# Patient Record
Sex: FEMALE | Race: WHITE | NOT HISPANIC OR LATINO | Employment: FULL TIME | URBAN - METROPOLITAN AREA
[De-identification: names, ages, dates, MRNs, and addresses within clinical notes are randomized per-mention and may not be internally consistent; named-entity substitution may affect disease eponyms.]

---

## 2018-02-06 ENCOUNTER — OFFICE VISIT (OUTPATIENT)
Dept: MATERNAL FETAL MEDICINE | Facility: CLINIC | Age: 33
End: 2018-02-06
Payer: COMMERCIAL

## 2018-02-06 VITALS
DIASTOLIC BLOOD PRESSURE: 65 MMHG | WEIGHT: 215 LBS | SYSTOLIC BLOOD PRESSURE: 110 MMHG | BODY MASS INDEX: 32.58 KG/M2 | HEIGHT: 68 IN

## 2018-02-06 DIAGNOSIS — N96 RECURRENT PREGNANCY LOSS WITHOUT CURRENT PREGNANCY: ICD-10-CM

## 2018-02-06 PROCEDURE — 3008F BODY MASS INDEX DOCD: CPT | Mod: S$GLB,,, | Performed by: OBSTETRICS & GYNECOLOGY

## 2018-02-06 PROCEDURE — 99203 OFFICE O/P NEW LOW 30 MIN: CPT | Mod: S$GLB,,, | Performed by: OBSTETRICS & GYNECOLOGY

## 2018-02-06 RX ORDER — PROGESTERONE 200 MG/1
200 CAPSULE ORAL DAILY
COMMUNITY
End: 2022-06-23

## 2018-02-06 NOTE — PROGRESS NOTES
Chief complaint: Recurrent pregnancy loss    Provider requesting consultation: Dr. Alcocer    32 y.o. [id number] Unknown EGA.    PMH:  Past Medical History:   Diagnosis Date    PCOS (polycystic ovarian syndrome)     Tibia fracture        PObHx:  OB History    Para Term  AB Living   4 1 1 0 3 1   SAB TAB Ectopic Multiple Live Births   3 0 0 0 1      # Outcome Date GA Lbr Manoj/2nd Weight Sex Delivery Anes PTL Lv   4 2016 6w0d          3 2015 6w0d          2 2014 13w0d          1 Term 10/28/07 40w0d  3.204 kg (7 lb 1 oz) M Vag-Spont   ADELINE      Complications: Fetal hydronephrosis during pregnancy, antepartum,Immune deficiency disorder          PSH:  Past Surgical History:   Procedure Laterality Date    CHOLECYSTECTOMY      DILATION AND CURETTAGE OF UTERUS         Family history:family history is not on file.    Social history: reports that she has never smoked. She has never used smokeless tobacco. She reports that she does not drink alcohol or use drugs.    A detailed fetal anatomical ultrasound was completed today.  See details in imaging section of EPIC.          A complete family history was obtained during her visit. There is no further family history of recurrent miscarriages.    There is no history of children born with multiple anomalies, mental retardation or chromosome abnormalities. There is no other reported history of birth defects, mental retardation, or other inherited conditions. Consanguinity was denied. She does have a strong family history of MI and stroke on her fathers side with all males before age 50.    Due to Ms.Ruby Jasmine's history of miscarriages, the genetic causes for failing to maintain a pregnancy and infertility were discussed. We discussed that there are several reasons for recurrent first trimester miscarriages. The first of these is a parental chromosome rearrangement. Chromosomes, translocations, and aneuploidies were discussed in detail. She expressed  understanding that parental chromosome rearrangement is the cause for only 2-5% of recurrent pregnancy loss. Chromosome abnormalities not related to parental chromosome rearrangements account for up to 50% of first trimester miscarriages. If she should have future miscarriages, testing on the products of conception could aid in the determination of the cause for the miscarriage.    We also discussed inherited thrombophilias as a potential cause for recurrent pregnancy loss. Mutations in Factor V Leiden, prothrombin, defects in protein C and protein S regulation can cause an increased risk for miscarriages. Though once thought to play a role in miscarriages, mutations in methylenetetrahydrofolate reductase (MTHFR) are no longer though to play a significant role. Many of these disorders can lead to abnormal clotting that impairs the implantation or development of the fetus early in gestation. Factor V Leiden is typically associated with late term pregnancy losses (i.e. 2nd and 3rd trimesters). Other, acquired thrombophilias and lupus anticoagulant studies were discussed in detail with the patient.      During today's visit, laboratory studies were ordered for *Factor V Leiden, Prothrombin, Protein C and Protein S, lupus anticoagulant, and antiphospholipid antibodies.  Reportedly she has already had genetic studies on 2 of her miscarriages and on her  and herself.  Unfortunately, I am not in possession of any records form her [facility] physician or her prior miscarriages.   These have been requested.        She has been scheduled for a follow-up appointment to review labs in approximately 3 weeks. 2 weeks If she should become pregnant in the next few weeks, she will notify our office as well as yours, which will allow for further management discussion.    The approximate physician face-to-face time was 30 minutes. The majority of the time (>50%) was spent on counseling of the patient or coordination of care.

## 2018-02-06 NOTE — LETTER
February 7, 2018      Maria Isabel Alcocer MD  [address] [place] [address]           [place] [facility]  [address] , [address]  [place] [address]  Phone: [phone]          Patient: Karoline Jasmine   MR Number: [id number]   YOB: 1985   Date of Visit: 2/6/2018       Dear Dr. Maria Isabel Alcocer:    Thank you for referring Karoline Jasmine to me for evaluation. Attached you will find relevant portions of my assessment and plan of care.    If you have questions, please do not hesitate to call me. I look forward to following Karoline Jasmine along with you.    Sincerely,    Jacob Oneill MD    Enclosure  CC:  No Recipients    If you would like to receive this communication electronically, please contact [email or web address] or [phone] to request more information on [employer] Link access.    For providers and/or their staff who would like to refer a patient to [facility], please contact us through our one-stop-shop provider referral line, [facility], at [phone].    If you feel you have received this communication in error or would no longer like to receive these types of communications, please e-mail [email or web address]

## 2018-02-27 ENCOUNTER — OFFICE VISIT (OUTPATIENT)
Dept: MATERNAL FETAL MEDICINE | Facility: CLINIC | Age: 33
End: 2018-02-27
Payer: COMMERCIAL

## 2018-02-27 VITALS — WEIGHT: 214 LBS | BODY MASS INDEX: 32.54 KG/M2 | SYSTOLIC BLOOD PRESSURE: 110 MMHG | DIASTOLIC BLOOD PRESSURE: 65 MMHG

## 2018-02-27 DIAGNOSIS — N96 RECURRENT PREGNANCY LOSS WITHOUT CURRENT PREGNANCY: Primary | ICD-10-CM

## 2018-02-27 PROCEDURE — 99213 OFFICE O/P EST LOW 20 MIN: CPT | Mod: S$GLB,,, | Performed by: OBSTETRICS & GYNECOLOGY

## 2018-02-28 NOTE — PROGRESS NOTES
The patient returns to review labs obtained on last visit.  Her genetic thrombophilia w/u, antiphospholipid and LAC all negative.   We have not yet received any meaningful records from [facility].    The patient will go to the office and hand deliver them to us.    So far only abnormality noted is low progesterone.   Will reevaluate when I have genetic studies on POC's and mother/father.

## 2019-03-25 ENCOUNTER — TELEPHONE (OUTPATIENT)
Dept: MATERNAL FETAL MEDICINE | Facility: CLINIC | Age: 34
End: 2019-03-25

## 2019-03-25 DIAGNOSIS — Z36.82 ENCOUNTER FOR NUCHAL TRANSLUCENCY TESTING: Primary | ICD-10-CM

## 2019-03-25 NOTE — TELEPHONE ENCOUNTER
"RN spoke with Debbie at Dr. Alcocer's office to inquire about the patient's referral to be asap. The patient was seen by [facility] in 2018 for a pre-pregnancy consult, with a negative thrombophilia workup. Dr. Oneill recommends that the patient be seen within the range for nuchal translucency and to have Dr. Alcocer draw the patient and her 's karyotype. He also stated that she does not have an indication to be on lovenox injections, but could start a baby ASA, however, it is only a "soft" indication. All information was relayed to Dr. Carlyn Lewis's nurse.    RN also spoke with the patient in regards to these recommendations, per Dr. Oneill, and she is comfortable with this plan. The patient will be seen in [place] at [facility] on 4/23/19.  "

## 2019-04-23 ENCOUNTER — PROCEDURE VISIT (OUTPATIENT)
Dept: MATERNAL FETAL MEDICINE | Facility: CLINIC | Age: 34
End: 2019-04-23
Payer: COMMERCIAL

## 2019-04-23 ENCOUNTER — INITIAL CONSULT (OUTPATIENT)
Dept: MATERNAL FETAL MEDICINE | Facility: CLINIC | Age: 34
End: 2019-04-23
Payer: COMMERCIAL

## 2019-04-23 VITALS
WEIGHT: 212.75 LBS | DIASTOLIC BLOOD PRESSURE: 78 MMHG | SYSTOLIC BLOOD PRESSURE: 110 MMHG | BODY MASS INDEX: 32.35 KG/M2

## 2019-04-23 DIAGNOSIS — Z36.89 ENCOUNTER FOR FETAL ANATOMIC SURVEY: Primary | ICD-10-CM

## 2019-04-23 DIAGNOSIS — Z36.82 ENCOUNTER FOR (NT) NUCHAL TRANSLUCENCY SCAN: ICD-10-CM

## 2019-04-23 DIAGNOSIS — N96 RECURRENT PREGNANCY LOSS WITHOUT CURRENT PREGNANCY: ICD-10-CM

## 2019-04-23 DIAGNOSIS — Z36.82 ENCOUNTER FOR NUCHAL TRANSLUCENCY TESTING: ICD-10-CM

## 2019-04-23 PROCEDURE — 99999 PR PBB SHADOW E&M-EST. PATIENT-LVL III: CPT | Mod: PBBFAC,,, | Performed by: OBSTETRICS & GYNECOLOGY

## 2019-04-23 PROCEDURE — 99999 PR PBB SHADOW E&M-EST. PATIENT-LVL III: ICD-10-PCS | Mod: PBBFAC,,, | Performed by: OBSTETRICS & GYNECOLOGY

## 2019-04-23 PROCEDURE — 76813 OB US NUCHAL MEAS 1 GEST: CPT | Mod: S$GLB,,, | Performed by: OBSTETRICS & GYNECOLOGY

## 2019-04-23 PROCEDURE — 99215 OFFICE O/P EST HI 40 MIN: CPT | Mod: 25,S$GLB,, | Performed by: OBSTETRICS & GYNECOLOGY

## 2019-04-23 PROCEDURE — 76813 PR US, OB NUCHAL, TRANSABDOM/TRANSVAG, FIRST GESTATION: ICD-10-PCS | Mod: S$GLB,,, | Performed by: OBSTETRICS & GYNECOLOGY

## 2019-04-23 PROCEDURE — 99215 PR OFFICE/OUTPT VISIT, EST, LEVL V, 40-54 MIN: ICD-10-PCS | Mod: 25,S$GLB,, | Performed by: OBSTETRICS & GYNECOLOGY

## 2019-04-23 NOTE — LETTER
April 26, 2019      Maria Isabel Alcocer MD  [address] [place] [address]           [facility]  [address] [address]  Phone: [phone]          Patient: Karoline Jasmine   MR Number: [id number]   YOB: 1985   Date of Visit: 4/23/2019       Dear Dr. Maria Isabel Alcocer:    Thank you for referring Karoline Jasmine to me for evaluation. Attached you will find relevant portions of my assessment and plan of care.    If you have questions, please do not hesitate to call me. I look forward to following Karoline Jasmine along with you.    Sincerely,    Sherley Gray MD    Enclosure  CC:  No Recipients    If you would like to receive this communication electronically, please contact [email or web address] or [phone] to request more information on [employer] Link access.    For providers and/or their staff who would like to refer a patient to [facility], please contact us through our one-stop-shop provider referral line, [facility], at [phone].    If you feel you have received this communication in error or would no longer like to receive these types of communications, please e-mail [email or web address]

## 2019-04-26 NOTE — PROGRESS NOTES
"Indication  ========    Consultation. Confirmation of cardiac activity.    History  ======    Previous Outcomes  Preg. no. 1  Outcome: Live YOB: 2007  Gest. age 40 w + 0 d  Gender: male  Details:  7 lb 1 oz primary combined immune deficiency  Preg. no. 2  Outcome: Spontaneous miscarriage  Gest. age 13 w + 0 d  Details:   Preg. no. 3  Outcome: Spontaneous miscarriage  Gest. age 6 w + 0 d  Details:   Preg. no. 4  Outcome: Spontaneous miscarriage  Gest. age 6 w + 0 d  Details:    5  Para 1  Martines children born living (T) 1  Martines children born (T) 1  Abortions (A) 3  Martines living children (L) 1  Miscarriages 3  Risk Factors  Details: Recurrent pregnancy loss  Details: Hypothyroid    Maternal Assessment  =================    Weight 97 kg  Weight (lb) 214 lb  BP syst 110 mmHg  BP diast 78 mmHg    Method  ======    Transabdominal ultrasound examination, Voluson E10. View: Good view.    Pregnancy  =========    Martines pregnancy. Number of fetuses: 1.    Dating  ======    GA by "stated dating" 11 w + 3 d  ROMEL by "stated dating": 2019  "Stated dating" by: Dr. DAWIT Alcocer  Ultrasound examination on: 2019  GA by U/S based upon: CRL  GA by U/S 12 w + 4 d  ROMEL by U/S: 2019  Assigned: Dating performed on 2019, based on the external assessment (by Dr. DAWIT Alcocer)  Assigned GA 11 w + 3 d  Assigned ROMEL: 2019    General Evaluation  ==============    Cardiac activity: present.  Amniotic fluid: normal amount.    Fetal Biometry  ============    CRL 62.0 mm 12w 4d Hadlock   bpm    Fetal Anatomy  ============    The following structures appear normal:  Cranium. Abdominal wall.    The following structures were visualized:  Arms. Legs.    Maternal Structures  ===============    Uterus / Cervix  Uterus: Normal  Ovaries / Tubes / Adnexa  Rt ovary: Normal  Rt ovary D1 2.1 cm  Rt ovary D2 1.8 cm  Rt ovary D3 1.8 cm  Rt ovary mean 1.9 cm  Rt ovary vol 3.4 cm³  Lt " ovary: Normal  Lt ovarian corpus luteum: visualized  Lt ovary D1 3.2 cm  Lt ovary D2 2.6 cm  Lt ovary D3 2.6 cm  Lt ovary mean 2.8 cm  Lt ovary vol 11.3 cm³  Lt ovarian corpus luteum D1 16.8 mm  Lt ovarian corpus luteum D2 16.6 mm  Lt ovarian corpus luteum D3 13.0 mm  Pouch of Giles / Other Structures  Free fluid: No free fluid visualized    Consultation  ==========    Type: Recurrent loss.  Ms. Jasmine is a 32y/o  at 11 and 3 weeks based on LMP not agreeing with 6 week ultrasound. By our ultrasound today, she is 12  weeks and 4 days.  Patient has seen Dr. Farooq in the past for counseling. He recommend the patient and her  have karyotypes drawn. She believes  she had done but he has not and no records were provided of this. Patient reports that she has had workup for JASON-reviewed limited records  scanned in [facility]. Patient was questioning whether she should be on Lovenox. Has not seen JASON in a few years.    PMHx: Hypothyroid-no meds  Bradycardia-HR in 60s at times-reports goes to as low as the 30s-no symptoms-normal pulse today (low 60s)  Recurrent pregnancy loss  PSHx: Gallbladder  D and Cx3  Family history birth defects: patient's son had hydronephrosis during pregnancy per chart (she did not report) and has primary combined  immunodeficiency syndrome and has weekly IVIG-no genetic mutation has been found-patient herself reports that she never gets sick  NIPT low risk  Meds: PNV, baby ASA,vaginal progesterone  POBHx: -Term  7 pounds 1 ounces  2014-13 week miscarriage- D and C-started with heart tones  2015-6 week miscarriage- D and C-started with heart tones-genetics normal per patient  2016-6 week miscarriage- D and C-started with heart tones-genetics normal per patient  Social: Denies smoking, alcohol, illicit drug use  chart also lists history of tibia fracture. per dr. farooq note there is history of stroke/mi in family    Normal HSG, negative APLS workup (ACLA, LAC and beta 2  glycoproteins), negative thrombophilia workup by Dr. Oneill  TSH 1.320  JASON notes in 2017-note normal male karyotype on one of the products-appears to be last loss and lists negative RPL workup    A/P:  1. IUP at 11 and 3 vs 12 and 4-Typically, the earliest ultrasound is the most accurate. We do not have access to outside images. Recommend  primary ob review images and determine which ROMEL desire to use and notify our office.    2. Recurrent pregnancy loss: The patient's chances of having a normal pregnancy outcome are good. We discussed unfortunately there is not  a safe point to say all is well but findings are very reassuring. Recommend primary ob confirm patient has had an assessment of diabetes  (would recommend primary ob check hgba1c at next appointment and if reassuring do traditional diabetes screening at 24 to 28 weeks) and  that maternal and paternal karyotypes are done. If the patient has another loss, recommend the tissue be sent for analysis for chromosomal  abnormalities. Recommend patient's thyroid be monitored. I discussed with her that she has no indication to be on Lovenox therapy as per Dr. Oneill's consult. Baby aspirin may or may not offer benefit but I believe it is reasonable to continue this.    3. Hypothyroidism: Her first trimester TSH was less than 2.5 which is in the appropriate range. The patient's TSH should be kept within the  range defined by ACOG (2nd trimester 0.2 to 3.0 mIU/L) (3rd trimester 0.3 to 3.0 mIU/L). Adverse outcomes have been associated with  uncontrolled thyroid disease. Patients with symptomatic hypothyroidism have a higher incidence of preeclampsia, placental abruption, IUGR  and fetal demise. Women who become euthyroid have significantly less complications than those who remain hypothyroid. The mainstay of  therapy is levothyroxine. Pregnant women typically require an increase in  their dosage of medication as the pregnancy progresses due to the increase in  plasma volume. TSH levels should be followed every 4 weeks  until euthyroid then every 6- 8 weeks for the remainder of the pregnancy. Immediately after delivery, the dosage of levothyroxine should be  reduced appropriately with another TSH level ordered 4 weeks postpartum.    4. Son with Immunodeficiency syndrome: Discussed with patient that there are different patterns of inheritance and it is difficult to give a  recurrence risk. Encourage patient to ask her son's physician about reanalysis of his genetic testing given advances in genetics over the past  few years. Advised her to remind pediatricians of her son's condition when this baby is born.    5. Per chart notes, her son had hydronephrosis. We will assess for this on anatomic survey. Notify peds of prior child's history.    6. Recommend growth ultrasound at approximately 32 weeks.    7. Primary ob should offer patient MSAFP only in the second trimester.    8.Recommend primary ob obtain EKG and refer to cardiology due to history of bradycardia.    45 minutes was spent in face to face time with greater than half of that time spent in counseling and coordination of care.    Impression  =========    Martines live intrauterine pregnancy.  Fetus measures 8 days ahead but patient was dated by first trimester scan not agreeing with LMP in her OB office.  Limited early anatomy appears normal. Unable to obtain accurate NT.  Left corpus luteum.    Recommendation  ==============    Follow up ultrasound in approximately 8 weeks in [employer] for anatomy scan (targeted).  Patient has no indication for Lovenox therapy at this time.  Recommend primary ob check karyotypes on patient and her  if this has not been done. Please forward to M.  Recommend primary ob confirm what ROMEL is being used for patient and notify our office.  Recommend patient consider having her son's whole genome/exome reanalyzed.  Primary ob managing thyroid disease. (see above)  Recommend primary  ob obtain EKG and refer to cardiology due to history of bradycardia.  Patient should also be screened for diabetes (see above).  Recommend growth ultrasound around 32 weeks of pregnancy.  Primary ob should offer patient MSAFP only in the second trimester.  Recommend primary ob notify peds of prior child's history.  Primary ob should monitor weight gain given BMI.

## 2019-06-26 ENCOUNTER — PROCEDURE VISIT (OUTPATIENT)
Dept: MATERNAL FETAL MEDICINE | Facility: CLINIC | Age: 34
End: 2019-06-26
Payer: COMMERCIAL

## 2019-06-26 VITALS
WEIGHT: 214.31 LBS | BODY MASS INDEX: 32.58 KG/M2 | SYSTOLIC BLOOD PRESSURE: 94 MMHG | DIASTOLIC BLOOD PRESSURE: 68 MMHG | HEART RATE: 95 BPM

## 2019-06-26 DIAGNOSIS — Z36.89 ENCOUNTER FOR ULTRASOUND TO CHECK FETAL GROWTH: Primary | ICD-10-CM

## 2019-06-26 DIAGNOSIS — Z36.89 ENCOUNTER FOR FETAL ANATOMIC SURVEY: ICD-10-CM

## 2019-06-26 DIAGNOSIS — O44.02 PLACENTA PREVIA IN SECOND TRIMESTER: ICD-10-CM

## 2019-06-26 PROCEDURE — 99202 PR OFFICE/OUTPT VISIT, NEW, LEVL II, 15-29 MIN: ICD-10-PCS | Mod: 25,,, | Performed by: OBSTETRICS & GYNECOLOGY

## 2019-06-26 PROCEDURE — 99202 OFFICE O/P NEW SF 15 MIN: CPT | Mod: 25,,, | Performed by: OBSTETRICS & GYNECOLOGY

## 2019-06-26 PROCEDURE — 76805 PR US, OB 14+WKS, TRANSABD, SINGLE GESTATION: ICD-10-PCS | Mod: ,,, | Performed by: OBSTETRICS & GYNECOLOGY

## 2019-06-26 PROCEDURE — 76805 OB US >/= 14 WKS SNGL FETUS: CPT | Mod: ,,, | Performed by: OBSTETRICS & GYNECOLOGY

## 2019-06-26 RX ORDER — NAPROXEN SODIUM 220 MG/1
81 TABLET, FILM COATED ORAL DAILY
COMMUNITY
End: 2022-06-23

## 2019-06-26 NOTE — PROGRESS NOTES
Chief complaint: Recurrent pregnancy loss, Placenta previa    Provider requesting consultation: Dr. Alcocer    33 y.o. [id number] 20w4d EGA    PMH:  Past Medical History:   Diagnosis Date    PCOS (polycystic ovarian syndrome)     Tibia fracture        PObHx:  OB History    Para Term  AB Living   5 1 1 0 3 1   SAB TAB Ectopic Multiple Live Births   3 0 0 0 1      # Outcome Date GA Lbr Manoj/2nd Weight Sex Delivery Anes PTL Lv   5 Current            4 2016 6w0d          3 2015 6w0d          2 2014 13w0d          1 Term 10/28/07 40w0d  3.204 kg (7 lb 1 oz) M Vag-Spont   ADELINE      Complications: Fetal hydronephrosis during pregnancy, antepartum, Immune deficiency disorder       PSH:  Past Surgical History:   Procedure Laterality Date    CHOLECYSTECTOMY      DILATION AND CURETTAGE OF UTERUS         Family history:family history is not on file.    Social history: reports that she has never smoked. She has never used smokeless tobacco. She reports that she does not drink alcohol or use drugs.    A detailed fetal anatomical ultrasound was completed today.  See details in imaging section of EPIC.      On ultrasound today the presence of a complete previa was seen.  I discussed with patient the bleeding risks associated with placenta previa.  I also reviewed the normal growth of the uterus and the high likelihood that the placental edge will migrate away from the cervical os.  After today's visit I would like to reinspect placental location at 32-36 weeks EGA to confirm it has moved from the cervical os.  Bleeding precautions and pelvic rest reviewed with patient today.     Results of today's ultrasound discussed with patient.  I spent 15 minutes with patient today over half of which was in consultation separate of her ultrasound examination.     Referring physician to receive copy of today's consultation via electronic medical record.    The patient was given an opportunity to ask questions about  management and the diease process.  She expressed an understanding of and agreement to the above impression and plan. All questions were answered to her satisfaction.  She was given contact information to the [facility] clinic to address further concerns.

## 2019-07-24 ENCOUNTER — TELEPHONE (OUTPATIENT)
Dept: MATERNAL FETAL MEDICINE | Facility: CLINIC | Age: 34
End: 2019-07-24

## 2019-07-24 NOTE — TELEPHONE ENCOUNTER
"Pt calling to report "constant dull pain, throbbing every now and then". Since yesterday she has been experiencing "some cramping". Pt states she "mentioned it to Dr Alcocer at OB appointment yesterday but she said that's normal". Pt denies any vaginal bleeding or leaking. Pt states she felt the baby a little last night but not much today. Pt keeps saying "I'm just not sure." Instructed pt to go to nearest L&D for full evaluation.    "

## 2019-07-29 ENCOUNTER — TELEPHONE (OUTPATIENT)
Dept: MATERNAL FETAL MEDICINE | Facility: CLINIC | Age: 34
End: 2019-07-29

## 2019-07-29 ENCOUNTER — PROCEDURE VISIT (OUTPATIENT)
Dept: MATERNAL FETAL MEDICINE | Facility: CLINIC | Age: 34
End: 2019-07-29
Payer: COMMERCIAL

## 2019-07-29 VITALS
DIASTOLIC BLOOD PRESSURE: 68 MMHG | WEIGHT: 217.38 LBS | BODY MASS INDEX: 33.06 KG/M2 | SYSTOLIC BLOOD PRESSURE: 118 MMHG

## 2019-07-29 DIAGNOSIS — Z36.89 ENCOUNTER FOR ULTRASOUND TO ASSESS FETAL GROWTH: Primary | ICD-10-CM

## 2019-07-29 DIAGNOSIS — Z36.89 ENCOUNTER FOR ULTRASOUND TO CHECK FETAL GROWTH: ICD-10-CM

## 2019-07-29 PROCEDURE — 99499 UNLISTED E&M SERVICE: CPT | Mod: ,,, | Performed by: OBSTETRICS & GYNECOLOGY

## 2019-07-29 PROCEDURE — 76816 PR  US,PREGNANT UTERUS,F/U,TRANSABD APP: ICD-10-PCS | Mod: ,,, | Performed by: OBSTETRICS & GYNECOLOGY

## 2019-07-29 PROCEDURE — 99499 NO LOS: ICD-10-PCS | Mod: ,,, | Performed by: OBSTETRICS & GYNECOLOGY

## 2019-07-29 PROCEDURE — 76816 OB US FOLLOW-UP PER FETUS: CPT | Mod: ,,, | Performed by: OBSTETRICS & GYNECOLOGY

## 2019-07-29 NOTE — TELEPHONE ENCOUNTER
Called and spoke to Dr. Carlyn Lewis's nurse to confirm which due date is being used for patient.  Debbie stated that they are using 11/9/19.

## 2019-08-26 ENCOUNTER — TELEPHONE (OUTPATIENT)
Dept: MATERNAL FETAL MEDICINE | Facility: CLINIC | Age: 34
End: 2019-08-26

## 2019-08-26 NOTE — TELEPHONE ENCOUNTER
Pt returned call to [facility] clinic and RN discussed physician lecture on 9/20/19. Offered patient to come in earlier in the am or to see another [facility] provider. Patient checked her schedule and is able to come in for 920am appointment.    Pt verbalized understanding of information.

## 2019-08-26 NOTE — TELEPHONE ENCOUNTER
Message left for pt to call [facility] clinic at [phone] to confirm that patient can see Dr. Oneill on 9/20/19 or if she prefer to see Dr. Gray, will need to reschedule to earlier in the am.

## 2019-09-20 ENCOUNTER — INITIAL CONSULT (OUTPATIENT)
Dept: MATERNAL FETAL MEDICINE | Facility: CLINIC | Age: 34
End: 2019-09-20
Attending: OBSTETRICS & GYNECOLOGY
Payer: COMMERCIAL

## 2019-09-20 ENCOUNTER — HOSPITAL ENCOUNTER (EMERGENCY)
Facility: OTHER | Age: 34
Discharge: HOME | End: 2019-09-20
Attending: OBSTETRICS & GYNECOLOGY
Payer: COMMERCIAL

## 2019-09-20 VITALS
SYSTOLIC BLOOD PRESSURE: 113 MMHG | HEART RATE: 67 BPM | DIASTOLIC BLOOD PRESSURE: 81 MMHG | OXYGEN SATURATION: 97 % | TEMPERATURE: 97 F

## 2019-09-20 VITALS — SYSTOLIC BLOOD PRESSURE: 108 MMHG | BODY MASS INDEX: 34.29 KG/M2 | DIASTOLIC BLOOD PRESSURE: 64 MMHG | WEIGHT: 225.5 LBS

## 2019-09-20 DIAGNOSIS — Z36.89 ENCOUNTER FOR ULTRASOUND TO CHECK FETAL GROWTH: Primary | ICD-10-CM

## 2019-09-20 DIAGNOSIS — O44.03 PLACENTA PREVIA IN THIRD TRIMESTER: ICD-10-CM

## 2019-09-20 DIAGNOSIS — O26.893 VAGINAL DISCHARGE DURING PREGNANCY IN THIRD TRIMESTER: Primary | ICD-10-CM

## 2019-09-20 DIAGNOSIS — N89.8 VAGINAL DISCHARGE DURING PREGNANCY IN THIRD TRIMESTER: Primary | ICD-10-CM

## 2019-09-20 DIAGNOSIS — O13.3 GESTATIONAL HYPERTENSION, THIRD TRIMESTER: ICD-10-CM

## 2019-09-20 DIAGNOSIS — Z36.89 ENCOUNTER FOR ULTRASOUND TO ASSESS FETAL GROWTH: ICD-10-CM

## 2019-09-20 DIAGNOSIS — Z36.89 ULTRASOUND SCAN TO EVALUATE PLACENTA LOCATION: ICD-10-CM

## 2019-09-20 DIAGNOSIS — Z3A.32 32 WEEKS GESTATION OF PREGNANCY: ICD-10-CM

## 2019-09-20 DIAGNOSIS — O44.40 LOW-LYING PLACENTA: ICD-10-CM

## 2019-09-20 DIAGNOSIS — O26.843 SIGNIFICANT DISCREPANCY BETWEEN UTERINE SIZE AND CLINICAL DATES, ANTEPARTUM, THIRD TRIMESTER: ICD-10-CM

## 2019-09-20 DIAGNOSIS — Z36.9 ENCOUNTER FOR FETAL ULTRASOUND: ICD-10-CM

## 2019-09-20 LAB
ALBUMIN SERPL BCP-MCNC: 3.1 G/DL (ref 3.5–5.2)
ALP SERPL-CCNC: 140 U/L (ref 55–135)
ALT SERPL W/O P-5'-P-CCNC: 8 U/L (ref 10–44)
ANION GAP SERPL CALC-SCNC: 9 MMOL/L (ref 8–16)
AST SERPL-CCNC: 11 U/L (ref 10–40)
BASOPHILS # BLD AUTO: 0.03 K/UL (ref 0–0.2)
BASOPHILS NFR BLD: 0.4 % (ref 0–1.9)
BILIRUB SERPL-MCNC: 0.2 MG/DL (ref 0.1–1)
BUN SERPL-MCNC: 10 MG/DL (ref 6–20)
CALCIUM SERPL-MCNC: 9.4 MG/DL (ref 8.7–10.5)
CHLORIDE SERPL-SCNC: 106 MMOL/L (ref 95–110)
CO2 SERPL-SCNC: 21 MMOL/L (ref 23–29)
CREAT SERPL-MCNC: 0.7 MG/DL (ref 0.5–1.4)
CREAT UR-MCNC: 59.6 MG/DL (ref 15–325)
DIFFERENTIAL METHOD: ABNORMAL
EOSINOPHIL # BLD AUTO: 0.1 K/UL (ref 0–0.5)
EOSINOPHIL NFR BLD: 0.7 % (ref 0–8)
ERYTHROCYTE [DISTWIDTH] IN BLOOD BY AUTOMATED COUNT: 13.3 % (ref 11.5–14.5)
EST. GFR  (AFRICAN AMERICAN): >60 ML/MIN/1.73 M^2
EST. GFR  (NON AFRICAN AMERICAN): >60 ML/MIN/1.73 M^2
GLUCOSE SERPL-MCNC: 74 MG/DL (ref 70–110)
HCT VFR BLD AUTO: 38.9 % (ref 37–48.5)
HGB BLD-MCNC: 13 G/DL (ref 12–16)
IMM GRANULOCYTES # BLD AUTO: 0.03 K/UL (ref 0–0.04)
IMM GRANULOCYTES NFR BLD AUTO: 0.4 % (ref 0–0.5)
LYMPHOCYTES # BLD AUTO: 1.3 K/UL (ref 1–4.8)
LYMPHOCYTES NFR BLD: 16.8 % (ref 18–48)
MCH RBC QN AUTO: 30.7 PG (ref 27–31)
MCHC RBC AUTO-ENTMCNC: 33.4 G/DL (ref 32–36)
MCV RBC AUTO: 92 FL (ref 82–98)
MONOCYTES # BLD AUTO: 0.4 K/UL (ref 0.3–1)
MONOCYTES NFR BLD: 5.4 % (ref 4–15)
NEUTROPHILS # BLD AUTO: 5.8 K/UL (ref 1.8–7.7)
NEUTROPHILS NFR BLD: 76.3 % (ref 38–73)
NRBC BLD-RTO: 0 /100 WBC
PLATELET # BLD AUTO: 200 K/UL (ref 150–350)
PMV BLD AUTO: 12.8 FL (ref 9.2–12.9)
POTASSIUM SERPL-SCNC: 4 MMOL/L (ref 3.5–5.1)
PROT SERPL-MCNC: 6.8 G/DL (ref 6–8.4)
PROT UR-MCNC: <7 MG/DL (ref 0–15)
PROT/CREAT UR: NORMAL MG/G{CREAT} (ref 0–0.2)
RBC # BLD AUTO: 4.24 M/UL (ref 4–5.4)
SODIUM SERPL-SCNC: 136 MMOL/L (ref 136–145)
WBC # BLD AUTO: 7.62 K/UL (ref 3.9–12.7)

## 2019-09-20 PROCEDURE — 36415 COLL VENOUS BLD VENIPUNCTURE: CPT

## 2019-09-20 PROCEDURE — 99284 EMERGENCY DEPT VISIT MOD MDM: CPT | Mod: 25

## 2019-09-20 PROCEDURE — 99999 PR PBB SHADOW E&M-EST. PATIENT-LVL II: ICD-10-PCS | Mod: PBBFAC,,, | Performed by: OBSTETRICS & GYNECOLOGY

## 2019-09-20 PROCEDURE — 80053 COMPREHEN METABOLIC PANEL: CPT

## 2019-09-20 PROCEDURE — 99999 PR PBB SHADOW E&M-EST. PATIENT-LVL II: CPT | Mod: PBBFAC,,, | Performed by: OBSTETRICS & GYNECOLOGY

## 2019-09-20 PROCEDURE — 76820 PR US, OB DOPPLER, FETAL UMBILICAL ARTERY ECHO: ICD-10-PCS | Mod: S$GLB,,, | Performed by: OBSTETRICS & GYNECOLOGY

## 2019-09-20 PROCEDURE — 76816 PR  US,PREGNANT UTERUS,F/U,TRANSABD APP: ICD-10-PCS | Mod: S$GLB,,, | Performed by: OBSTETRICS & GYNECOLOGY

## 2019-09-20 PROCEDURE — 76820 UMBILICAL ARTERY ECHO: CPT | Mod: S$GLB,,, | Performed by: OBSTETRICS & GYNECOLOGY

## 2019-09-20 PROCEDURE — 85025 COMPLETE CBC W/AUTO DIFF WBC: CPT

## 2019-09-20 PROCEDURE — 99499 NO LOS: ICD-10-PCS | Mod: S$GLB,,, | Performed by: OBSTETRICS & GYNECOLOGY

## 2019-09-20 PROCEDURE — 99215 OFFICE O/P EST HI 40 MIN: CPT | Mod: 25,S$GLB,, | Performed by: OBSTETRICS & GYNECOLOGY

## 2019-09-20 PROCEDURE — 76819 PR US, OB, FETAL BIOPHYSICAL, W/O NST: ICD-10-PCS | Mod: 59,S$GLB,, | Performed by: OBSTETRICS & GYNECOLOGY

## 2019-09-20 PROCEDURE — 82570 ASSAY OF URINE CREATININE: CPT

## 2019-09-20 PROCEDURE — 76819 FETAL BIOPHYS PROFIL W/O NST: CPT | Mod: 59,S$GLB,, | Performed by: OBSTETRICS & GYNECOLOGY

## 2019-09-20 PROCEDURE — 59025 FETAL NON-STRESS TEST: CPT

## 2019-09-20 PROCEDURE — 99215 PR OFFICE/OUTPT VISIT, EST, LEVL V, 40-54 MIN: ICD-10-PCS | Mod: 25,S$GLB,, | Performed by: OBSTETRICS & GYNECOLOGY

## 2019-09-20 PROCEDURE — 99499 UNLISTED E&M SERVICE: CPT | Mod: S$GLB,,, | Performed by: OBSTETRICS & GYNECOLOGY

## 2019-09-20 PROCEDURE — 76816 OB US FOLLOW-UP PER FETUS: CPT | Mod: S$GLB,,, | Performed by: OBSTETRICS & GYNECOLOGY

## 2019-09-20 NOTE — PROGRESS NOTES
Radha with OB ED notified that pt is coming for evaluation of BP and ROM per Dr Gray. She verbalized understanding and reports the OB ED provider is already aware the pt is coming.

## 2019-09-20 NOTE — PROGRESS NOTES
Indication  ========    F/U Consultation. Follow-up evaluation for fetal growth and placental location    History  ======    General History  Height 173 cm  Height (ft) 5 ft  Height (in) 8 in  Medical History  Surgery: D&C x4  Previous Outcomes   5  Para 1  [facility] children born living (T) 1  [facility] children born (T) 1  Abortions (A) 3  [facility] living children (L) 1  Miscarriages 3  Preg. no. 1  Outcome: live birth  Date: Outcome date: 2007-10-28  Gest. age 40 w + 0 d  Gender: male  Details:  7 lb 1 oz primary combined immune deficiency  Preg. no. 2  Outcome: miscarriage  Gest. age 13 w + 0 d  Details: , D&C x2  Preg. no. 3  Outcome: miscarriage  Gest. age 6 w + 0 d  Details: , D&C  Preg. no. 4  Outcome: miscarriage  Gest. age 6 w + 0 d  Details: 2016, D&C  Risk Factors  Details: Recurrent pregnancy loss  Details: Hypothyroid    Pregnancy History  ==============    Maternal Lab Tests  Result:    AFP tetra negative DSR 1:2994 (age DSR 1:374), T18 risk not increased    Maternal Assessment  =================    Height 173 cm  Height (ft) 5 ft  Height (in) 8 in  Weight 102 kg  Weight (lb) 225 lb  BMI 34.19 kg/m²  BP syst 108 mmHg  BP diast 64 mmHg    Fetal Growth Overview  =================    Exam date        GA              BPD (mm)         HC (mm)        AC (mm)        FL (mm)         HL (mm)        EFW (g)  2019        20w 4d        48.6                  185.3             160.7             35.1              33.4               398    40%  2019        25w 2d        61.9                  229.0             215.4             47.3              45.4               852    55%  2019        32w 6d        77.8                  303.5             268.3             62.7                                   1,821    15%      Method  ======    2D Color Doppler, Voluson E10, Transabdominal ultrasound examination. View: Suboptimal view: limited by fetal position. Suboptimal view:  limited by  late gestational age    Pregnancy  =========    [facility]ines pregnancy. Number of fetuses: 1    Dating  ======    Cycle: regular cycle  Prior assessment by: Dr. DAWIT Alcocer  GA by prior assessment 32 w + 6 d  ROMEL by prior assessment: 11/9/2019  Ultrasound examination on: 9/20/2019  GA by U/S based upon: AC, BPD, Femur, HC  GA by U/S 32 w + 1 d  ROMEL by U/S: 11/14/2019  Assigned: Dating performed on 04/23/2019, based on the external assessment (by Dr. DAWIT Alcocer)  Assigned GA 32 w + 6 d  Assigned ROMEL: 11/9/2019  Pregnancy length 280 d    General Evaluation  ==============    Cardiac activity present.  bpm.  Fetal movements visualized.  Presentation transverse.  Placenta anterior, marginal previa.  Amniotic fluid Amount of AF: normal amount. MVP 4.0 cm. CRISTOFER 9.9 cm. Q1 0.0 cm, Q2 4.0 cm, Q3 3.2 cm, Q4 2.6 cm.    Biophysical Profile  ==============    2: Fetal breathing movements  2: Gross body movements  2: Fetal tone  2: Amniotic fluid volume  8/8 Biophysical profile score    Fetal Biometry  ============    Standard  BPD 77.8 mm  31w 2d                Hadlock    .1 mm  36w 0d                Khoa    .5 mm  33w 5d                Hadlock    .3 mm  31w 0d                Hadlock    Femur 62.7 mm  32w 3d                Hadlock    HC / AC 1.13    EFW 1,821 g          15%        Rich    EFW (lb) 4 lb  EFW (oz) 0 oz  EFW by: Hadlock (BPD-HC-AC-FL)  Head / Face / Neck  Cephalic index 0.71    Extremities / Bony Struc  FL / BPD 0.81    FL / AC 0.23    Other Structures   bpm    Fetal Anatomy  ============    4-chamber view: documented previously  Heart / Thorax  3-vessel-trachea view: suboptimal  Stomach: normal  Kidneys: normal  Bladder: normal  Gender: male  Wants to know gender: yes    Fetal Doppler  ===========    Arterial  Umbilical A PI 1.56          >99%        Bhavik    Umbilical A RI 0.80          >99%        Bhavik    Umbilical A PS -29.61 cm/s    Umbilical A ED -5.96 cm/s  Umbilical A  TAmax -15.44 cm/s    Umbilical A MD -5.74 cm/s  Umbilical A S / D 5.18          >99%        Bhavik    Umbilical A  bpm    Consultation  ==========    Type: Follow up  the patient presented for follow-up assessment of placental location and fetal growth today. The patient reports that beginning on Sunday she  has some cramping and she also had some leakage of fluid. She reports that she was seen at her hospital in [place] and they did a swab  test and nitrazine and reports that they are both negative. Additionally, she reports that her blood pressures have been elevated she had as  high as 150s over 110s once at home. She reports in her doctor's office bp have been in 140s over 90s. She has not been given the diagnosis  of gestational hypertension or preeclampsia. She did have lab studies drawn with her doctor and she showed me the results on her phone. on  9/16/19, her hgb wa 12.2, plt 192, cr 0.72, AST 11, ALT 6. She did not have assessment of urine protein. She denies any preeclampsia  symptoms. The patient also reports increased swelling in her hands and feet.    The placenta still appears low lying on transabdominal ultrasound. However, given her complaints of leakage of fluid a transvaginal ultrasound  was not performed at this time so as not to interfere with assessment for rupture of membranes.    I discussed with the patient that she would need an evaluation for ruptured membranes and for preeclampsia in the OB emergency department  before planning further care. The fetal growth has dropped significantly but is still technically in the normal range. The AC is lagging. These can  all be signs of placental compromise as pregnancy progresses. I discussed with her that we will recheck the fetal growth in 2-3 weeks and  placental location if needed. I contacted her obstetrician's office and spoke with her obstetrician's nurse and they are okay with her going to the  OB ED here. I spoke with the nurse  and asked about her blood pressures. She did confirm the patient has had multiple elevated blood  pressures at multiple visits.. They have not done an assessment of urine protein. The patient had an NST in their office yesterday which was  reassuring and her bp was normal yesterday.. They are only going to check NSTs weekly unless we make alternative recommendations. After  learning of this information, I had UA Dopplers performed on the fetus which demonstrated an increased UA SD ratio but no absent or reverse  end-diastolic flow. Fetus is technically not IUGR but has lower normal EFW with lagging AC. Primary ob office plans to send additional records.      The patient reports she did see Cardiology but has been waiting on follow-up with them due to her history of bradycardia. They did not call her  and indicated there are any abnormal results. The nursing from the [facility] had confirmed the patient's ROMEL. Please see my prior notes for  details. She reports her thyroid has been well controlled.    Given the increase UA Dopplers I recommend a follow-up ultrasound with [facility] in 1 week for UA Doppler and BPP. The patient should have  NSTs with her obstetrician on . She will need weekly preeclampsia labs through her primary ob and will need assessment of her urine  protein weekly if she does not meet criteria for preeclampsia. She will need delivery at 37 weeks gestation if no indication for earlier delivery. If  the marginal previa persists, delivery would be recommended between 36 and 37 weeks gestation. Patient aware earlier delivery may be  indicated depending on growth,  fetal surveillance, symptoms, and labs.    Patient is aware of the potential need for admission pending evaluation in [facility]. Aware of potential need for BMZ. Risks of adverse maternal  and fetal outcomes with gestational HTN and preeclampsia were reviewed. Risks of /early term delivery were reviewed.    If patient has PPROM, she  would be admitted and delivery would be at 34 weeks or earlier if indicated.    40 minutes was spent in face to face time with greater than half of that time spent in counseling and coordination of care.    Impression  =========    [facility] live intrauterine pregnancy.  EFW is at the at the 15th percentile with lagging AC.  Normal amniotic fluid volume.  3vt is suboptimal.  Remainder of limited fetal anatomy appears normal.  On TA scanning, the placenta still appears to be a marginal placenta previa. TV scan not performed due to fact that patient complained of  leakage of fluid and we did not want to affect ROM testing.  After obtaining more information from primary ob office, UA dopplers were performed. There is diastolic flow present, however, UA doppler S/D  ratio is elevated.  BPP 8/8.    Recommendation  ==============    To [facility] for preeclampsia and ROM evaluation.  Discussed with Reji La and Dr. Oneill. They will contact Dr. Oneill after workup to determine disposition.  IF patient not ruptured, TV exam could be done today if desired to assess placental location or at follow up ultrasound.  Primary ob should do NSTs on Tuesdays and check weekly preeclampsia labs as above.  If signs or symptoms of severe gestational HTN or preeclampsia with severe features, admission would be required.  Low threshold for admission and low threshold for BMZ.  Primary ob office reportedly sending additional records.  Delivery timing as above.  Please contact MFM through transfer center if questions at [phone].  Follow up with MFM in 1 week for BPP/UA doppler and in 2-3 weeks for growth and placental location if needed. Will need to schedule other scans depending on follow up.  See note above.

## 2019-09-20 NOTE — ED PROVIDER NOTES
Encounter Date: 2019       History   No chief complaint on file.    Karoline Jasmine is a 33 y.o. [id number] at 32w6d presents from [facility] clinic with complaints of leakage of fluid, these are small amounts and no gush.  This IUP is complicated by low lying placenta and elevated BPs.  Patient denies contractions, denies vaginal bleeding.  Fetal Movement: normal.  Of note, the pregnancy has been growing on the smaller side, 15% on last growth US. She got UA dopplers today and they were elevated.  Denies HA/visual changes/RUQ pain.  She is a patient of Dr Alcocer in [place]        Review of patient's allergies indicates:  No Known Allergies  Past Medical History:   Diagnosis Date    PCOS (polycystic ovarian syndrome)     Tibia fracture      Past Surgical History:   Procedure Laterality Date    CHOLECYSTECTOMY      DILATION AND CURETTAGE OF UTERUS       No family history on file.  Social History     Tobacco Use    Smoking status: Never Smoker    Smokeless tobacco: Never Used   Substance Use Topics    Alcohol use: No    Drug use: No     Review of Systems   Constitutional: Negative for fever.   Eyes: Negative for visual disturbance.   Respiratory: Negative for shortness of breath.    Cardiovascular: Negative for chest pain.   Gastrointestinal: Negative for abdominal pain.   Genitourinary: Positive for vaginal discharge. Negative for vaginal bleeding.   Musculoskeletal: Negative for back pain.   Skin: Negative for rash.   Neurological: Negative for light-headedness.   Hematological: Does not bruise/bleed easily.   Psychiatric/Behavioral: The patient is not nervous/anxious.        Physical Exam     Initial Vitals   BP Pulse Resp Temp SpO2   19 1138 19 1138 -- 19 1153 19 1138   127/84 62  97.4 °F (36.3 °C) 99 %      MAP       --                Physical Exam    Vitals reviewed.  Constitutional: She appears well-developed and well-nourished. She is not diaphoretic. No distress.   Cardiovascular:  Normal rate, regular rhythm, normal heart sounds and intact distal pulses.   Pulmonary/Chest: Breath sounds normal.   Abdominal: Soft. There is no tenderness. There is no guarding.   Genitourinary:   Genitourinary Comments: SSE: NEG pooling, NEG ferning, NEG nitrazine  Normal physiologic vaginal discharge  Cervix visually closed   Neurological: She is alert and oriented to person, place, and time. She has normal strength. No sensory deficit.   Psychiatric: She has a normal mood and affect. Her behavior is normal. Judgment and thought content normal.         ED Course   Obtain Fetal nonstress test (NST)  Date/Time: 2019 1:52 PM  Performed by: Beatriz La MD  Authorized by: Katy Claire MD     Nonstress Test:     Variability:  6-25 BPM    Decelerations:  None    Accelerations:  15 bpm    Baseline:  145    Contractions:  Not present  Biophysical Profile:     Nonstress Test Interpretation: reactive      Overall Impression:  Reassuring      Labs Reviewed - No data to display       Imaging Results    None          Medical Decision Making:   ED Management:  Woden without contractions  Prolonged monitoring reactive and reassuring  Spec exam: NEG pooling, NEG ferning, NEG nitrazine  PreE labs all WNL, UPC too low to calc  Pt has met criteria based on primary's OBs blood pressures therefore we recommend weekly OB visits, weekly PreE labs, twice weekly  testing. She has normal Bps in [facility] today.   She will need serial growth US and a repeat US for placental location at [facility] [place], operated by [facility]. I have message the [facility] clinic VITALIY Michaels for help with scheduling  This plan was discussed with Dr Oneill [facility] who was in agreement                      Clinical Impression:       ICD-10-CM ICD-9-CM   1. Vaginal discharge during pregnancy in third trimester O26.893 646.83    N89.8 623.5   2. Gestational hypertension, third trimester O13.3 642.33   3. Low-lying placenta O44.40 641.10   4. 32 weeks gestation of pregnancy Z3A.32  V22.2                                Beatriz La MD  09/20/19 1356

## 2019-09-20 NOTE — DISCHARGE INSTRUCTIONS
You need:  Twice weekly non stress tests  Weekly OB visits with BP checks  Weekly CBC, CMP, protein/creatinine ratio    1. Call the office with headache that does not improve with Tylenol or visual changes  2. Call the office with blood pressure greater than or equal to 160/110  3. Kick counts, call if not 10 movements in 1-2 hours    You will be called with appt for transvaginal US at [facility]

## 2019-09-23 ENCOUNTER — TELEPHONE (OUTPATIENT)
Dept: MATERNAL FETAL MEDICINE | Facility: CLINIC | Age: 34
End: 2019-09-23

## 2019-09-23 NOTE — TELEPHONE ENCOUNTER
Called patient back today because she is having some visual changes and had a rough night with vomiting and some blood pressures 140's/90's.  Patient instructed to go to Labor and Delivery in [place] to be checked out.  Patient verbalized her understanding.

## 2019-09-23 NOTE — TELEPHONE ENCOUNTER
Spoke with 's nurse to let them know I sent Mrs.Ruby Jasmine to Labor and Delivery to be checked out.

## 2019-09-25 ENCOUNTER — TELEPHONE (OUTPATIENT)
Dept: MATERNAL FETAL MEDICINE | Facility: CLINIC | Age: 34
End: 2019-09-25

## 2019-09-25 NOTE — TELEPHONE ENCOUNTER
Return call to patient and no answer, no voicemail to leave a message. Will inform MFM.    ----- Message from Radha Bacon MA sent at 9/25/2019  2:48 PM CDT -----  The patient called to let us know that Dr. Alcocer will be admitting her to the hospital and plans to keep her until the second which is when they plan to deliver her.

## 2019-09-26 ENCOUNTER — TELEPHONE (OUTPATIENT)
Dept: MATERNAL FETAL MEDICINE | Facility: CLINIC | Age: 34
End: 2019-09-26

## 2019-09-26 NOTE — TELEPHONE ENCOUNTER
Confirmed with Dr. Alcocer's nurse Debbie, that Ms. Jasmine was admitted to the hospital, receiving steroids and will remain inpatient until delivery next week.    MFM appointments have been canceled.

## 2022-05-26 ENCOUNTER — TELEPHONE (OUTPATIENT)
Dept: OBSTETRICS AND GYNECOLOGY | Facility: CLINIC | Age: 37
End: 2022-05-26
Payer: COMMERCIAL

## 2022-05-26 NOTE — TELEPHONE ENCOUNTER
----- Message from Maricarmen Baez sent at 5/26/2022  2:04 PM CDT -----  Contact: PT  Type:  Sooner Appointment Request    NEEDS SCHEDULED    Name of Caller:  the patient  When is the first available appointment?  NONE  Symptoms:  WW  Best Call Back Number:  [phone]  Additional Information:

## 2022-06-20 ENCOUNTER — PATIENT MESSAGE (OUTPATIENT)
Dept: OBSTETRICS AND GYNECOLOGY | Facility: CLINIC | Age: 37
End: 2022-06-20
Payer: COMMERCIAL

## 2022-06-23 ENCOUNTER — LAB VISIT (OUTPATIENT)
Dept: LAB | Facility: CLINIC | Age: 37
End: 2022-06-23
Payer: COMMERCIAL

## 2022-06-23 ENCOUNTER — OFFICE VISIT (OUTPATIENT)
Dept: OBSTETRICS AND GYNECOLOGY | Facility: CLINIC | Age: 37
End: 2022-06-23
Payer: COMMERCIAL

## 2022-06-23 VITALS
WEIGHT: 209 LBS | SYSTOLIC BLOOD PRESSURE: 116 MMHG | DIASTOLIC BLOOD PRESSURE: 74 MMHG | HEIGHT: 69 IN | BODY MASS INDEX: 30.96 KG/M2

## 2022-06-23 DIAGNOSIS — N87.1 DYSPLASIA OF CERVIX, HIGH GRADE CIN 2: ICD-10-CM

## 2022-06-23 DIAGNOSIS — Z12.4 PAP SMEAR FOR CERVICAL CANCER SCREENING: ICD-10-CM

## 2022-06-23 DIAGNOSIS — Z01.419 ENCOUNTER FOR ANNUAL ROUTINE GYNECOLOGICAL EXAMINATION: Primary | ICD-10-CM

## 2022-06-23 DIAGNOSIS — E03.9 HYPOTHYROIDISM, UNSPECIFIED TYPE: ICD-10-CM

## 2022-06-23 PROCEDURE — 88175 CYTOPATH C/V AUTO FLUID REDO: CPT | Performed by: OBSTETRICS & GYNECOLOGY

## 2022-06-23 PROCEDURE — 99395 PREV VISIT EST AGE 18-39: CPT | Mod: S$GLB,,, | Performed by: OBSTETRICS & GYNECOLOGY

## 2022-06-23 PROCEDURE — 84443 ASSAY THYROID STIM HORMONE: CPT | Performed by: OBSTETRICS & GYNECOLOGY

## 2022-06-23 PROCEDURE — 99395 PR PREVENTIVE VISIT,EST,18-39: ICD-10-PCS | Mod: S$GLB,,, | Performed by: OBSTETRICS & GYNECOLOGY

## 2022-06-23 RX ORDER — DEXTROAMPHETAMINE SACCHARATE, AMPHETAMINE ASPARTATE MONOHYDRATE, DEXTROAMPHETAMINE SULFATE AND AMPHETAMINE SULFATE 7.5; 7.5; 7.5; 7.5 MG/1; MG/1; MG/1; MG/1
30 CAPSULE, EXTENDED RELEASE ORAL
COMMUNITY
Start: 2022-05-26

## 2022-06-23 RX ORDER — ASPIRIN 325 MG
50000 TABLET, DELAYED RELEASE (ENTERIC COATED) ORAL
COMMUNITY
Start: 2022-06-18

## 2022-06-23 NOTE — PROGRESS NOTES
"Annual gyn exam    HPI:  Karoline Jasmine is a 36 y.o. female  presents for a well woman exam.  LMP: Patient's last menstrual period was 2022 (approximate)..      She complains of some right breast pain, radiating into the axilla, denies feeling a mass    Past Medical History:   Diagnosis Date    Abnormal Pap smear of cervix     CHUCKIE 2, cryo     PCOS (polycystic ovarian syndrome)     Thyroid disease     Tibia fracture      Past Surgical History:   Procedure Laterality Date     SECTION      CHOLECYSTECTOMY      DILATION AND CURETTAGE OF UTERUS      TUBAL LIGATION      With  2019    WISDOM TOOTH EXTRACTION       Social History     Socioeconomic History    Marital status:    Tobacco Use    Smoking status: Never Smoker    Smokeless tobacco: Never Used   Substance and Sexual Activity    Alcohol use: No    Drug use: No     Family History   Problem Relation Age of Onset    Breast cancer Neg Hx     Ovarian cancer Neg Hx     Uterine cancer Neg Hx     Colon cancer Neg Hx      OB History        5    Para   2    Term   1       1    AB   3    Living   2       SAB   3    IAB   0    Ectopic   0    Multiple   0    Live Births   2                 /74 (BP Location: Right arm, Patient Position: Sitting)   Ht 5' 9" (1.753 m)   Wt 94.8 kg (209 lb)   LMP 2022 (Approximate)   BMI 30.86 kg/m²     ROS:  GENERAL: Denies weight gain or weight loss. Feeling well overall.   SKIN: Denies rash or lesions.   HEAD: Denies head injury or headache.   NODES: Denies enlarged lymph nodes.   CHEST: Denies chest pain or shortness of breath.   CARDIOVASCULAR: Denies palpitations or left sided chest pain.   ABDOMEN: No abdominal pain, constipation, diarrhea, nausea, vomiting or rectal bleeding.   URINARY: No frequency, dysuria, hematuria, or burning on urination.  REPRODUCTIVE: See HPI.   BREASTS: The patient performs breast self-examination and denies pain, lumps, or nipple " discharge.   HEMATOLOGIC: No easy bruisability or excessive bleeding.   MUSCULOSKELETAL: Denies joint pain or swelling.   NEUROLOGIC: Denies syncope or weakness.   PSYCHIATRIC: Denies depression, anxiety or mood swings.    PHYSICAL EXAM:    APPEARANCE: Well nourished, well developed, in no acute distress.  AFFECT: WNL, alert and oriented x 3  SKIN: No acne or hirsutism  NECK: Neck symmetric without masses or thyromegaly  NODES: No inguinal, cervical, axillary, or femoral lymph node enlargement  CHEST: Good respiratory effect  ABDOMEN: Soft.  No tenderness or masses.  No hepatosplenomegaly.  No hernias.  BREASTS: Symmetrical, no skin changes or visible lesions.  No palpable masses, nipple discharge bilaterally.  PELVIC:     Pelvic  Vv normal mucosa, without discharge or lesions  Cervix clear, Pap done  Uterus anteverted normal size  Adnexa normal, without masses    EXTREMITIES: No edema.      ICD-10-CM ICD-9-CM    1. Encounter for annual routine gynecological examination  Z01.419 V72.31    2. Dysplasia of cervix, high grade CHUCKIE 2  N87.1 622.12 Liquid-Based Pap Smear, Screening   3. Pap smear for cervical cancer screening  Z12.4 V76.2 Liquid-Based Pap Smear, Screening   4. Hypothyroidism, unspecified type  E03.9 244.9 TSH     Assessment and plan:    1. History of CHUCKIE 2, Pap done  2. New onset breast pain.  Schedule bilateral mammogram with right breast ultrasound.  Exam was benign  3. History of hypothyroidism, currently on no treatment.  Check TSH    Return 1 year or as needed

## 2022-06-24 LAB — TSH SERPL DL<=0.005 MIU/L-ACNC: 1.6 UIU/ML (ref 0.4–4)

## 2022-06-24 PROCEDURE — 36415 PR COLLECTION VENOUS BLOOD,VENIPUNCTURE: ICD-10-PCS | Mod: ,,, | Performed by: OBSTETRICS & GYNECOLOGY

## 2022-06-24 PROCEDURE — 36415 COLL VENOUS BLD VENIPUNCTURE: CPT | Mod: ,,, | Performed by: OBSTETRICS & GYNECOLOGY

## 2022-06-28 ENCOUNTER — TELEPHONE (OUTPATIENT)
Dept: OBSTETRICS AND GYNECOLOGY | Facility: CLINIC | Age: 37
End: 2022-06-28
Payer: COMMERCIAL

## 2022-06-28 NOTE — TELEPHONE ENCOUNTER
----- Message from Jenny Thayer sent at 6/28/2022  9:09 AM CDT -----  Contact: pt  Type: Needs Medical Advice    Who Called: pt  Best Call Back Number: [phone]  Inquiry/Question: pt calling to speak with Dr or nurse about mammo and ultrasound that was ordered, states that when she went for mammo and the ultrasound tech advise that they states ultrasound was unnecessary pt just calling to make sire this is ok, please advise pt  Thank you~

## 2022-06-30 LAB
FINAL PATHOLOGIC DIAGNOSIS: NORMAL
Lab: NORMAL